# Patient Record
Sex: FEMALE | Race: WHITE | NOT HISPANIC OR LATINO | URBAN - METROPOLITAN AREA
[De-identification: names, ages, dates, MRNs, and addresses within clinical notes are randomized per-mention and may not be internally consistent; named-entity substitution may affect disease eponyms.]

---

## 2017-01-05 ENCOUNTER — COMMUNICATION - HEALTHEAST (OUTPATIENT)
Dept: NEUROLOGY | Facility: CLINIC | Age: 77
End: 2017-01-05

## 2017-01-09 ENCOUNTER — COMMUNICATION - HEALTHEAST (OUTPATIENT)
Dept: NEUROLOGY | Facility: CLINIC | Age: 77
End: 2017-01-09

## 2017-01-11 ENCOUNTER — HOSPITAL ENCOUNTER (OUTPATIENT)
Dept: RADIOLOGY | Facility: CLINIC | Age: 77
Discharge: HOME | End: 2017-01-11
Attending: PSYCHIATRY & NEUROLOGY

## 2017-01-11 ENCOUNTER — HOSPITAL ENCOUNTER (OUTPATIENT)
Dept: NEUROLOGY | Facility: CLINIC | Age: 77
Setting detail: THERAPIES SERIES
Discharge: STILL ADMITTED OR ADMINISTRATIVE | End: 2017-01-11
Attending: PSYCHIATRY & NEUROLOGY

## 2017-01-11 DIAGNOSIS — R06.02 SOB (SHORTNESS OF BREATH): ICD-10-CM

## 2017-01-11 DIAGNOSIS — F32.A DEPRESSION: ICD-10-CM

## 2017-01-11 LAB
ATRIAL RATE - MUSE: 54 BPM
DIASTOLIC BLOOD PRESSURE - MUSE: NORMAL MMHG
INTERPRETATION ECG - MUSE: NORMAL
P AXIS - MUSE: 16 DEGREES
PR INTERVAL - MUSE: 212 MS
QRS DURATION - MUSE: 92 MS
QT - MUSE: 514 MS
QTC - MUSE: 487 MS
R AXIS - MUSE: -40 DEGREES
SYSTOLIC BLOOD PRESSURE - MUSE: NORMAL MMHG
T AXIS - MUSE: 35 DEGREES
VENTRICULAR RATE- MUSE: 54 BPM

## 2017-01-12 ENCOUNTER — AMBULATORY - HEALTHEAST (OUTPATIENT)
Dept: NEUROLOGY | Facility: CLINIC | Age: 77
End: 2017-01-12

## 2017-01-12 DIAGNOSIS — F32.A DEPRESSION: ICD-10-CM

## 2017-01-19 ENCOUNTER — COMMUNICATION - HEALTHEAST (OUTPATIENT)
Dept: NEUROLOGY | Facility: CLINIC | Age: 77
End: 2017-01-19

## 2017-01-23 ENCOUNTER — COMMUNICATION - HEALTHEAST (OUTPATIENT)
Dept: NEUROLOGY | Facility: CLINIC | Age: 77
End: 2017-01-23

## 2017-02-02 ENCOUNTER — COMMUNICATION - HEALTHEAST (OUTPATIENT)
Dept: NEUROLOGY | Facility: CLINIC | Age: 77
End: 2017-02-02

## 2017-02-06 ENCOUNTER — HOSPITAL ENCOUNTER (EMERGENCY)
Facility: CLINIC | Age: 77
Discharge: HOME | End: 2017-02-06
Attending: FAMILY MEDICINE | Admitting: FAMILY MEDICINE
Payer: MEDICARE

## 2017-02-06 VITALS
BODY MASS INDEX: 23.77 KG/M2 | SYSTOLIC BLOOD PRESSURE: 100 MMHG | DIASTOLIC BLOOD PRESSURE: 93 MMHG | OXYGEN SATURATION: 100 % | WEIGHT: 130 LBS | HEART RATE: 60 BPM | TEMPERATURE: 98.1 F | RESPIRATION RATE: 16 BRPM

## 2017-02-06 DIAGNOSIS — R41.0 DELIRIUM: ICD-10-CM

## 2017-02-06 DIAGNOSIS — F03.91 DEMENTIA WITH BEHAVIORAL DISTURBANCE, UNSPECIFIED DEMENTIA TYPE: ICD-10-CM

## 2017-02-06 LAB
ALBUMIN SERPL-MCNC: 3.5 G/DL (ref 3.4–5)
ALBUMIN UR-MCNC: 100 MG/DL
ALP SERPL-CCNC: 96 U/L (ref 40–150)
ALT SERPL W P-5'-P-CCNC: 19 U/L (ref 0–50)
AMMONIA PLAS-SCNC: 22 UMOL/L (ref 10–50)
AMPHETAMINES UR QL SCN: NORMAL
ANION GAP SERPL CALCULATED.3IONS-SCNC: 12 MMOL/L (ref 3–14)
APPEARANCE UR: CLEAR
AST SERPL W P-5'-P-CCNC: 18 U/L (ref 0–45)
BARBITURATES UR QL: NORMAL
BASOPHILS # BLD AUTO: 0 10E9/L (ref 0–0.2)
BASOPHILS NFR BLD AUTO: 0.3 %
BENZODIAZ UR QL: NORMAL
BILIRUB SERPL-MCNC: 0.2 MG/DL (ref 0.2–1.3)
BILIRUB UR QL STRIP: NEGATIVE
BUN SERPL-MCNC: 28 MG/DL (ref 7–30)
CALCIUM SERPL-MCNC: 8.5 MG/DL (ref 8.5–10.1)
CANNABINOIDS UR QL SCN: NORMAL
CHLORIDE SERPL-SCNC: 108 MMOL/L (ref 94–109)
CO2 BLDCOV-SCNC: 20 MMOL/L (ref 21–28)
CO2 SERPL-SCNC: 21 MMOL/L (ref 20–32)
COCAINE UR QL: NORMAL
COLOR UR AUTO: YELLOW
CREAT SERPL-MCNC: 2.49 MG/DL (ref 0.52–1.04)
DIFFERENTIAL METHOD BLD: ABNORMAL
EOSINOPHIL # BLD AUTO: 0.1 10E9/L (ref 0–0.7)
EOSINOPHIL NFR BLD AUTO: 1.4 %
ERYTHROCYTE [DISTWIDTH] IN BLOOD BY AUTOMATED COUNT: 14.1 % (ref 10–15)
ETHANOL UR QL SCN: NORMAL
GFR SERPL CREATININE-BSD FRML MDRD: 19 ML/MIN/1.7M2
GLUCOSE SERPL-MCNC: 72 MG/DL (ref 70–99)
GLUCOSE UR STRIP-MCNC: NEGATIVE MG/DL
HCT VFR BLD AUTO: 28.6 % (ref 35–47)
HGB BLD-MCNC: 9.6 G/DL (ref 11.7–15.7)
HGB UR QL STRIP: NEGATIVE
IMM GRANULOCYTES # BLD: 0 10E9/L (ref 0–0.4)
IMM GRANULOCYTES NFR BLD: 0.4 %
KETONES UR STRIP-MCNC: NEGATIVE MG/DL
LACTATE BLD-SCNC: 0.5 MMOL/L (ref 0.7–2.1)
LEUKOCYTE ESTERASE UR QL STRIP: NEGATIVE
LYMPHOCYTES # BLD AUTO: 1.2 10E9/L (ref 0.8–5.3)
LYMPHOCYTES NFR BLD AUTO: 17.3 %
MAGNESIUM SERPL-MCNC: 2.5 MG/DL (ref 1.6–2.3)
MCH RBC QN AUTO: 31.6 PG (ref 26.5–33)
MCHC RBC AUTO-ENTMCNC: 33.6 G/DL (ref 31.5–36.5)
MCV RBC AUTO: 94 FL (ref 78–100)
MONOCYTES # BLD AUTO: 0.8 10E9/L (ref 0–1.3)
MONOCYTES NFR BLD AUTO: 11.6 %
NEUTROPHILS # BLD AUTO: 4.8 10E9/L (ref 1.6–8.3)
NEUTROPHILS NFR BLD AUTO: 69 %
NITRATE UR QL: NEGATIVE
NRBC # BLD AUTO: 0 10*3/UL
NRBC BLD AUTO-RTO: 0 /100
OPIATES UR QL SCN: NORMAL
PCO2 BLDV: 34 MM HG (ref 40–50)
PH BLDV: 7.38 PH (ref 7.32–7.43)
PH UR STRIP: 6 PH (ref 5–7)
PLATELET # BLD AUTO: 249 10E9/L (ref 150–450)
PO2 BLDV: 25 MM HG (ref 25–47)
POTASSIUM SERPL-SCNC: 4.3 MMOL/L (ref 3.4–5.3)
PROT SERPL-MCNC: 6.2 G/DL (ref 6.8–8.8)
RBC # BLD AUTO: 3.04 10E12/L (ref 3.8–5.2)
RBC #/AREA URNS AUTO: <1 /HPF (ref 0–2)
SAO2 % BLDV FROM PO2: 44 %
SODIUM SERPL-SCNC: 141 MMOL/L (ref 133–144)
SP GR UR STRIP: 1.01 (ref 1–1.03)
TSH SERPL DL<=0.05 MIU/L-ACNC: 1.77 MU/L (ref 0.4–4)
URN SPEC COLLECT METH UR: ABNORMAL
UROBILINOGEN UR STRIP-MCNC: NORMAL MG/DL (ref 0–2)
WBC # BLD AUTO: 7 10E9/L (ref 4–11)
WBC #/AREA URNS AUTO: 2 /HPF (ref 0–2)

## 2017-02-06 PROCEDURE — 36415 COLL VENOUS BLD VENIPUNCTURE: CPT | Performed by: FAMILY MEDICINE

## 2017-02-06 PROCEDURE — 80053 COMPREHEN METABOLIC PANEL: CPT | Performed by: FAMILY MEDICINE

## 2017-02-06 PROCEDURE — 82140 ASSAY OF AMMONIA: CPT | Performed by: FAMILY MEDICINE

## 2017-02-06 PROCEDURE — 83735 ASSAY OF MAGNESIUM: CPT | Performed by: FAMILY MEDICINE

## 2017-02-06 PROCEDURE — 99284 EMERGENCY DEPT VISIT MOD MDM: CPT | Performed by: FAMILY MEDICINE

## 2017-02-06 PROCEDURE — 82803 BLOOD GASES ANY COMBINATION: CPT

## 2017-02-06 PROCEDURE — 85025 COMPLETE CBC W/AUTO DIFF WBC: CPT | Performed by: FAMILY MEDICINE

## 2017-02-06 PROCEDURE — 84443 ASSAY THYROID STIM HORMONE: CPT | Performed by: FAMILY MEDICINE

## 2017-02-06 PROCEDURE — 99285 EMERGENCY DEPT VISIT HI MDM: CPT | Mod: 25 | Performed by: FAMILY MEDICINE

## 2017-02-06 PROCEDURE — 80320 DRUG SCREEN QUANTALCOHOLS: CPT | Performed by: FAMILY MEDICINE

## 2017-02-06 PROCEDURE — 93010 ELECTROCARDIOGRAM REPORT: CPT | Mod: Z6 | Performed by: FAMILY MEDICINE

## 2017-02-06 PROCEDURE — 81001 URINALYSIS AUTO W/SCOPE: CPT | Performed by: FAMILY MEDICINE

## 2017-02-06 PROCEDURE — 96361 HYDRATE IV INFUSION ADD-ON: CPT | Performed by: FAMILY MEDICINE

## 2017-02-06 PROCEDURE — 25000128 H RX IP 250 OP 636: Performed by: FAMILY MEDICINE

## 2017-02-06 PROCEDURE — 96360 HYDRATION IV INFUSION INIT: CPT | Performed by: FAMILY MEDICINE

## 2017-02-06 PROCEDURE — 83605 ASSAY OF LACTIC ACID: CPT

## 2017-02-06 PROCEDURE — 93005 ELECTROCARDIOGRAM TRACING: CPT | Performed by: FAMILY MEDICINE

## 2017-02-06 PROCEDURE — 80307 DRUG TEST PRSMV CHEM ANLYZR: CPT | Performed by: FAMILY MEDICINE

## 2017-02-06 RX ORDER — LIDOCAINE 40 MG/G
CREAM TOPICAL
Status: DISCONTINUED | OUTPATIENT
Start: 2017-02-06 | End: 2017-02-06 | Stop reason: HOSPADM

## 2017-02-06 RX ORDER — SODIUM CHLORIDE 9 MG/ML
1000 INJECTION, SOLUTION INTRAVENOUS CONTINUOUS
Status: DISCONTINUED | OUTPATIENT
Start: 2017-02-06 | End: 2017-02-06 | Stop reason: HOSPADM

## 2017-02-06 RX ADMIN — SODIUM CHLORIDE 1000 ML: 9 INJECTION, SOLUTION INTRAVENOUS at 17:29

## 2017-02-06 RX ADMIN — SODIUM CHLORIDE 1000 ML: 9 INJECTION, SOLUTION INTRAVENOUS at 19:16

## 2017-02-06 ASSESSMENT — ENCOUNTER SYMPTOMS
FATIGUE: 1
ABDOMINAL PAIN: 0
FEVER: 0
APPETITE CHANGE: 1
SHORTNESS OF BREATH: 0

## 2017-02-06 NOTE — ED AVS SNAPSHOT
[facility], [place], Emergency Department    [address]    [facility] [address]    Phone:  [phone]    Fax:  [phone]                                       Sandy Montana   MRN: [id number]    Department:  [facility], Emergency Department   Date of Visit:  2/6/2017           After Visit Summary Signature Page     I have received my discharge instructions, and my questions have been answered. I have discussed any challenges I see with this plan with the nurse or doctor.    ..........................................................................................................................................  Patient/Patient Representative Signature      ..........................................................................................................................................  Patient Representative Print Name and Relationship to Patient    ..................................................               ................................................  Date                                            Time    ..........................................................................................................................................  Reviewed by Signature/Title    ...................................................              ..............................................  Date                                                            Time

## 2017-02-06 NOTE — ED PROVIDER NOTES
History     Chief Complaint   Patient presents with     Generalized Weakness     HPI  Sandy Montana is a 76 year old female with a history of HTN, lymphoma (2011, 2015 currently on Rituximab), dementia, sleep apnea, CVA 2 years ago and depression. Hospitalized at [facility] (1/26-1/27/2017) for a UTI and confusion. The patient was diagnosed with a UTI on 1/24 (~2 weeks ago) and was started on a 7 day course of Keflex. However, her UC was resistant to Keflex and so was switched to Ciprofloxacin on 1/26. She then began to exhibit confusion and agitation and was admitted. She was treated with IV ceftriaxone with improvement in her mentation. She was then admitted again to [facility] (1/31-2/2/2017) due to weakness. CT and MRI negative. She presents to the Emergency Department today for evaluation of fatigue. Per the patient's care giver, for the past 6 weeks the patient has been difficult to arouse in the morning to get up for the day and when she does get up she will sit in her chair and fall asleep. They also have to force to her eat. This has persisted even with treatment of her UTI a few weeks ago. Today, the patient fell asleep in her chair around 11:00 AM (~7 hours ago) and her care giver could not wake her for 3 hours, prompting her to call EMS. No known cough or fever. The patient denies any pain. No other concerns or complaints.  ppatient is been taking her Effexor for 2 years typically in the morning.  She noted per her daughter that when she is in the hospital her behavior improves only when she sent home daughter is concerned that  Patient lives in an independent assisted living does have PCA care during the day.  But not atNight time.      Past Medical History   Diagnosis Date     Depressive disorder      Hypertension      CVA (cerebral infarction) 1/2014     Dementia      Chronic kidney disease      Lymphoma (H)        No past surgical history on file.    No family history on  file.    Social History   Substance Use Topics     Smoking status: Not on file     Smokeless tobacco: Not on file     Alcohol Use: Not on file       No current facility-administered medications for this encounter.     Current Outpatient Prescriptions   Medication     VENLAFAXINE HCL PO     AMLODIPINE BESYLATE PO     Omega-3 Fatty Acids (OMEGA-3 FISH OIL PO)     DONEPEZIL HCL PO     MEMANTINE HCL PO     ASPIRIN PO     ATORVASTATIN CALCIUM PO     Calcium Carbonate-Vitamin D (CALCIUM + D PO)     VITAMIN D, CHOLECALCIFEROL, PO     OMEPRAZOLE PO     Solifenacin Succinate (VESICARE PO)     LOSARTAN POTASSIUM PO      No Known Allergies    I have reviewed the Medications, Allergies, Past Medical and Surgical History, and Social History in the Epic system.    Review of Systems   Constitutional: Positive for activity change (patient now is been awake today although was very sleepylethargic earlier today), appetite change (decreased) and fatigue. Negative for fever.   HENT: Negative for congestion, facial swelling, sinus pressure and trouble swallowing.    Eyes: Negative for redness and visual disturbance.   Respiratory: Negative for cough, choking and shortness of breath.    Cardiovascular: Negative for chest pain and leg swelling.   Gastrointestinal: Negative for nausea, vomiting and abdominal pain.   Genitourinary: Negative for flank pain and difficulty urinating.        Patient baseline incontinent of urine   Musculoskeletal: Negative for back pain, arthralgias, gait problem and neck stiffness.   Skin: Negative for color change, rash and wound.   Allergic/Immunologic: Negative for immunocompromised state.   Neurological: Positive for weakness (Gen. patient previous strokes slight right-sided weakness unchanged). Negative for seizures, numbness and headaches.   Hematological: Does not bruise/bleed easily.   Psychiatric/Behavioral: Positive for confusion, sleep disturbance, dysphoric mood and decreased concentration.  Negative for hallucinations. The patient is nervous/anxious. The patient is not hyperactive.    All other systems reviewed and are negative.      Physical Exam   BP: 153/72 mmHg  Pulse: 51  Temp: 97.3  F (36.3  C)  Resp: 12  SpO2: 98 %  Physical Exam   Constitutional: She is oriented to person, place, and time. She appears well-developed and well-nourished. She appears distressed.   Patient here in the ER is awake staring continues to repeat and anxious placing I just can't sleep  Denies any other complaints of pain   HENT:   Head: Normocephalic and atraumatic.   Eyes: Conjunctivae and EOM are normal. Pupils are equal, round, and reactive to light. No scleral icterus.   Neck: Normal range of motion. Neck supple. No JVD present.   Cardiovascular: Regular rhythm.    Pulmonary/Chest: No stridor. No respiratory distress. She has no wheezes.   Abdominal: She exhibits no distension. There is no tenderness. There is no rebound.   Musculoskeletal: She exhibits no edema or tenderness.   Neurological: She is alert and oriented to person, place, and time. She has normal reflexes.   Skin: Skin is warm and dry. No rash noted. She is not diaphoretic. No erythema. No pallor.   Psychiatric:   Patient with a flattened affect somewhat staring at times findings concerning for maybe some mild delirium.   Patient has the dementia also.   Nursing note and vitals reviewed.      ED Course     4:51 PM  The patient was seen and examined by Dr. Escoto in Room 6.     Procedures           Patient ER was evaluated.  eKG interpreted below.  Records reviewed in caring aware with patient's recent hospitalizations at [facility]  Patient received a liter normal saline IV bolus in ER.  Laboratory evaluations urinalysis without infection white count was 7 hemoglobin is 9.6 magnesium 2.5  Creatinine 2.49.  Sodium 140 potassium 4.3.  TSH 1.77.  Ammonia 22.  PCO2 is 34 and a venous blood gas lactic acid 0.5.    Evaluating patient patient is awake here in the ER  is not lethargic is not somnolent seems to be somewhat staringanxious underlying with findings consistent with some delirium component.  Discussed at length with psychiatry Dr. Rhodes who felt at this point reviewing patient's medications  Recreations are to take the Effexor at bedtime which may add to increasing somnolence at night and then not be so sleepy during the day.  Discussed at length with the daughter who is here is voicing concerns I did talk to the hospitalist also felt from a medical standpoint there is really no other workup that would do in the hospital.  Daughter at this point though is comfortable with the plan of taking her home and she'll stay with her tonight she'll take a half a dose of her Effexor tonight as she took her normal dose earlier today and then will start with an evening dose tomorrow and see how that does.  Also make contact with psychiatry associate with [facility] who they are afiliated with.  Patient then discharged with daughter back home.        EKG Interpretation:      Interpreted by Anshu Escoto  Time reviewed: 1713  Symptoms at time of EKG: sleepy   Rhythm: normal sinus   Rate: normal  Axis: normal  Ectopy: none  Conduction: first-degree  ST Segments/ T Waves: No hhyperacuteST-T wave changes  Q Waves: none  Comparison to prior: No old EKG available    Clinical Impression: normal sinus rhythm first-degree block          Critical Care time:  none               Labs Ordered and Resulted from Time of ED Arrival Up to the Time of Departure from the ED   CBC WITH PLATELETS DIFFERENTIAL - Abnormal; Notable for the following:     RBC Count 3.04 (*)     Hemoglobin 9.6 (*)     Hematocrit 28.6 (*)     All other components within normal limits   MAGNESIUM - Abnormal; Notable for the following:     Magnesium 2.5 (*)     All other components within normal limits   COMPREHENSIVE METABOLIC PANEL - Abnormal; Notable for the following:     Creatinine 2.49 (*)     GFR Estimate 19 (*)      GFR Estimate If Black 23 (*)     Protein Total 6.2 (*)     All other components within normal limits   UA MACROSCOPIC WITH REFLEX TO MICRO AND CULTURE - Abnormal; Notable for the following:     Protein Albumin Urine 100 (*)     All other components within normal limits   ISTAT  GASES LACTATE AMAURY POCT - Abnormal; Notable for the following:     PCO2 Venous 34 (*)     Bicarbonate Venous 20 (*)     Lactic Acid 0.5 (*)     All other components within normal limits   TSH   AMMONIA   DRUG ABUSE SCREEN 6 CHEM DEP URINE ([facility])   PULSE OXIMETRY NURSING   CARDIAC CONTINUOUS MONITORING   PERIPHERAL IV CATHETER   ISTAT CG4 GASES LACTATE AMAURY NURSING POCT     Results for orders placed or performed during the hospital encounter of 02/06/17   CBC with platelets differential   Result Value Ref Range    WBC 7.0 4.0 - 11.0 10e9/L    RBC Count 3.04 (L) 3.8 - 5.2 10e12/L    Hemoglobin 9.6 (L) 11.7 - 15.7 g/dL    Hematocrit 28.6 (L) 35.0 - 47.0 %    MCV 94 78 - 100 fl    MCH 31.6 26.5 - 33.0 pg    MCHC 33.6 31.5 - 36.5 g/dL    RDW 14.1 10.0 - 15.0 %    Platelet Count 249 150 - 450 10e9/L    Diff Method Automated Method     % Neutrophils 69.0 %    % Lymphocytes 17.3 %    % Monocytes 11.6 %    % Eosinophils 1.4 %    % Basophils 0.3 %    % Immature Granulocytes 0.4 %    Nucleated RBCs 0 0 /100    Absolute Neutrophil 4.8 1.6 - 8.3 10e9/L    Absolute Lymphocytes 1.2 0.8 - 5.3 10e9/L    Absolute Monocytes 0.8 0.0 - 1.3 10e9/L    Absolute Eosinophils 0.1 0.0 - 0.7 10e9/L    Absolute Basophils 0.0 0.0 - 0.2 10e9/L    Abs Immature Granulocytes 0.0 0 - 0.4 10e9/L    Absolute Nucleated RBC 0.0    Magnesium   Result Value Ref Range    Magnesium 2.5 (H) 1.6 - 2.3 mg/dL   Comprehensive metabolic panel   Result Value Ref Range    Sodium 141 133 - 144 mmol/L    Potassium 4.3 3.4 - 5.3 mmol/L    Chloride 108 94 - 109 mmol/L    Carbon Dioxide 21 20 - 32 mmol/L    Anion Gap 12 3 - 14 mmol/L    Glucose 72 70 - 99 mg/dL    Urea Nitrogen 28 7 - 30 mg/dL     Creatinine 2.49 (H) 0.52 - 1.04 mg/dL    GFR Estimate 19 (L) >60 mL/min/1.7m2    GFR Estimate If Black 23 (L) >60 mL/min/1.7m2    Calcium 8.5 8.5 - 10.1 mg/dL    Bilirubin Total 0.2 0.2 - 1.3 mg/dL    Albumin 3.5 3.4 - 5.0 g/dL    Protein Total 6.2 (L) 6.8 - 8.8 g/dL    Alkaline Phosphatase 96 40 - 150 U/L    ALT 19 0 - 50 U/L    AST 18 0 - 45 U/L   TSH   Result Value Ref Range    TSH 1.77 0.40 - 4.00 mU/L   Ammonia   Result Value Ref Range    Ammonia 22 10 - 50 umol/L   UA reflex to Microscopic and Culture   Result Value Ref Range    Color Urine Yellow     Appearance Urine Clear     Glucose Urine Negative NEG mg/dL    Bilirubin Urine Negative NEG    Ketones Urine Negative NEG mg/dL    Specific Gravity Urine 1.007 1.003 - 1.035    Blood Urine Negative NEG    pH Urine 6.0 5.0 - 7.0 pH    Protein Albumin Urine 100 (A) NEG mg/dL    Urobilinogen mg/dL Normal 0.0 - 2.0 mg/dL    Nitrite Urine Negative NEG    Leukocyte Esterase Urine Negative NEG    Source Catheterized Urine     RBC Urine <1 0 - 2 /HPF    WBC Urine 2 0 - 2 /HPF   Drug abuse screen 6 urine (chem dep)   Result Value Ref Range    Amphetamine Qual Urine  NEG     Negative   Cutoff for a negative amphetamine is 500 ng/mL or less.      Barbiturates Qual Urine  NEG     Negative   Cutoff for a negative barbiturate is 200 ng/mL or less.      Benzodiazepine Qual Urine  NEG     Negative   Cutoff for a negative benzodiazepine is 200 ng/mL or less.      Cannabinoids Qual Urine  NEG     Negative   Cutoff for a negative cannabinoid is 50 ng/mL or less.      Cocaine Qual Urine  NEG     Negative   Cutoff for a negative cocaine is 300 ng/mL or less.      Ethanol Qual Urine  NEG     Negative   Cutoff for a negative urine ethanol is 0.05 g/dL or less      Opiates Qualitative Urine  NEG     Negative   Cutoff for a negative opiate is 300 ng/mL or less.     EKG 12-lead, tracing only   Result Value Ref Range    Interpretation ECG Click View Image link to view waveform and result     ISTAT gases lactate maximiliano POCT   Result Value Ref Range    Ph Venous 7.38 7.32 - 7.43 pH    PCO2 Venous 34 (L) 40 - 50 mm Hg    PO2 Venous 25 25 - 47 mm Hg    Bicarbonate Venous 20 (L) 21 - 28 mmol/L    O2 Sat Venous 44 %    Lactic Acid 0.5 (L) 0.7 - 2.1 mmol/L       Assessments & Plan (with Medical Decision Making)  6-year-old female history of dementia who has had 4+ weeks of increasing somnolence during the day yesterday complaining about sleeping difficulties.  He is supposed to see pap but has not been using it for quite a while there is no signs of CO2 retention although her symptoms still may be partly related to that issue.  Patient has been on Effexor during the day of her behavior improves and hospital she becomes more somnolent at home feeling at this point is to switch the Effexor 200 bedtime doseto see if this improves sleep pattern at night encourage her to use  The C pap and to follow-up with psychiatry at [place].  Patient at this point labs reviewed given IV fluids no sign of UTIand his CO2 retention cardiac evaluation Center stablepatient may be slightly dehydratedbut did receive the IV fluids.   She most likely is having some delirium which may persist for several weeks so seen with her dementia and may be progression of the disease discussed with daughter at length that they may need to look into further 24-hour care with her currently at this point but will try medication change encouraging that C Pap unit to see if this does improve also.         I have reviewed the nursing notes.    I have reviewed the findings, diagnosis, plan and need for follow up with the patient.    Discharge Medication List as of 2/6/2017  9:33 PM          Final diagnoses:   Delirium   Dementia with behavioral disturbance, unspecified dementia type     I, Nae Rider, am serving as a trained medical scribe to document services personally performed by Anshu Escoto MD, based on the provider's statements to me.      I,  Anshu Escoto MD, was physically present and have reviewed and verified the accuracy of this note documented by Nae Rider.     2/6/2017   [facility], [facility] EMERGENCY DEPARTMENT      This note was created at least in part by the use of dragon voice dictation system. Inadvertent typographical errors may still exist.  Anshu Escoto MD.        Anshu Escoto MD  02/07/17 2002

## 2017-02-06 NOTE — ED NOTES
Sent from care facility where staff reported her unresponsive. Staff can't wake pt up. Staff here with patient. States pt was sleeping. Pt requests a sleeping pill. Staff reports pt is exhausted and would sleep all the time if able. Staff feel there is more of a mental health issue with patient rather a medical problem.

## 2017-02-06 NOTE — ED NOTES
Bed: ED06  Expected date: 2/6/17  Expected time: 2:27 PM  Means of arrival: Ambulance  Comments:  [address] 73 y/o F, lethargic.

## 2017-02-06 NOTE — ED AVS SNAPSHOT
[facility], Emergency Department    [address]    [facility] [address]    Phone:  [phone]    Fax:  [phone]                                       Sandy [place]   MRN: [id number]    Department:  [facility] [place], Emergency Department   Date of Visit:  2/6/2017           Patient Information     Date Of Birth          1940        Your diagnoses for this visit were:     Delirium     Dementia with behavioral disturbance, unspecified dementia type        You were seen by Anshu Escoto MD.      Follow-up Information     Follow up with Zaida Smith.    Specialty:  Internal Medicine    Contact information:    [facility]  [address] [facility] [address] [phone]          Discharge Instructions       Home.  Current recommendations are to take effexor 37.5mg tonight before bed, then tomorrow switch to effexor 75mg at bedtime to see if this helps sleep and has better daytime awakeness.  Follow up with [place] [facility] for further recommendations.  Try to use Cpap also to see if this helps behavior and sleep.    For Caregivers: Daily Care for Dementia Patients  Over time, people with dementia will need more and more help with daily tasks. These include eating meals, taking medicines, and getting enough exercise. They also include personal care needs, such as bathing and dressing. To reduce stress, make these activities part of a routine. Ask family and friends to lend a hand. And be aware that your loved one s abilities can change from day to day. If you have problems meeting your loved one s needs, it s time to get help. Talk to a  or local support agency--such as a local [employer] s [employer] chapter.      Gardening can be a pleasant way to keep your loved one active.   Activity and exercise  Regular activity is good for your loved one s body and mind. It may even help slow the progression of the disease. Keep to your loved one s old routines when  possible. It also helps to:    Do things together. Go for a walk, garden, or bake a cake. Basic, repetitive activities are good choices.    Be active as often as possible. This releases pent-up energy, which can reduce restlessness and improve sleep.    Include social activities. Take your loved one to see friends and family. But try to keep things simple. Loud noises, crowds, or too many people talking at once can be upsetting.  Taking medicines  Be sure all prescribed medicines are taken as directed. These tips can help:    Provide supervision if your loved one cannot safely take medicines alone.    Set a routine so medicines are taken at the same time each day.    Ensure that ALL medicines are taken. A pillbox can help you keep track.    Plan ahead. Be sure to refill prescriptions before they run out.  Eating meals  At mealtime, serve healthy foods with plenty of fluids. These tips can also help:    Keep meals simple. Too many choices can be overwhelming. Try to maintain a calm, quiet atmosphere while you eat.    Place healthy snacks, such as fresh fruit, out where they can be seen.    Watch eating habits. People with dementia may eat too little or too much. Talk to the healthcare provider if you have concerns.    Try finger foods if regular meals become too difficult for your loved one to eat.  Dressing  People with dementia may have trouble choosing what to wear. It s OK if clothes don t always match. But if help is needed:    Choose clothing that is easy to put on and take off. Use Velcro shoes or slippers.    Lay out a fresh outfit each day. Place clothes in the order they should be put on.    If more help is needed, hand over clothing items one at a time. Explain how each item should be put on.    Put dirty clothes away so they re not worn again.  Bathing and grooming  Getting your loved one to bathe can be a real challenge. Try these tips:    Treat bathing as a routine activity. But be flexible. A daily  bath is probably unrealistic.    Prepare bath items ahead of time, and be sure to test the water temperature.    Avoid leaving your loved one alone in the bath or shower.               Try visiting a barbershop or [employer] salon for help with hair washing, hair styling, and shaving.  Using the toilet  In later stages, dementia patients may develop incontinence (trouble controlling the bladder or bowel). To ease problems:    Set a routine for using the toilet (for example, every 3 hours) and stick to it.    Limit beverages before bedtime to prevent accidents. A bedside commode may also help.    Be understanding if accidents happen. Your loved one may be as upset as you.    At one point it may be necessary to have them wear an adult diaper.      Talk to a healthcare provider if incontinence develops suddenly. It may signal other health issues that can be treated.  When to call the healthcare provider  Call the healthcare provider if you notice a sudden change in your loved one s behavior or emotions. These changes may be due to dementia. But they could also signal other health problems that can be treated.   NOTE:This sheet is a summary. It may not cover all possible information. If you have questions about this medicine, talk to your doctor, pharmacist, or health care provider. Copyright  2016 [employer]          Caring for a Person with Delirium  Delirium is very common in patients with advanced illness. With delirium, patients have periods in which they are suddenly confused and unaware of what is going on around them. They may become agitated and restless or withdrawn. Delirium can be very upsetting for family members to witness. If your loved one develops signs of delirium, let his or her health care provider know right away. In some cases, the cause of the delirium can be treated. In others, steps can be taken to help manage delirium and ensure your loved one s safety and comfort.     With delirium, there may  be times when your loved one is restless and agitated or quiet and withdrawn.   What are common causes of delirium?  Delirium often has multiple causes. These can include:    Infections    Medicines    Serious or terminal illness    Hospitalization    Changes in environment    Drug and alcohol abuse    Electrolyte imbalance    Other problems, like anemia or nutrition deficiency  What are the types and symptoms of delirium?  There are 2 main types of delirium: hypoactive and hyperactive. Patients can have 1 or both types. In a hypoactive state, patients are sleepy and withdrawn. They may show little interest in their surroundings. In a hyperactive state, patients are excitable and agitated. They may become violent. And they may believe in or see things that aren t there. Most patients with delirium will also have the following symptoms:    Changes in sleep patterns    Confusion about time and place (disorientation)    Wandering attention    Disorganized thinking    Problems with memory and speech    Changes in mood or personality    Hallucinations  How is delirium treated?  Your loved one s health care provider and health care team will try to identify the cause of the delirium and treat it, if possible. Sometimes, the cause cannot be found. Or treatment may be available, but it may be too much of a burden at this point in their illness. In such cases, the main goal of treatment is to manage the delirium and keep your loved one safe and comfortable. You may be told to do the following:    Provide safe and familiar surroundings. Keep your loved one s room clean and well-lit. Have familiar objects nearby, like a favorite blanket and family photos. Add a clock next to the patient s bed and a calendar on the wall to help your loved one keep track of time.    Limit contact with strangers. Try to make sure that your loved one receives care from the same health care providers or caregivers. Keep visitors restricted to  family members or close friends to reduce confusion.    Maintain a regular day and night schedule. During the day, open blinds and windows or keep the lights on to encourage your loved one to stay awake and alert. During the night, dim the lights and keep noise levels low to encourage sleep.    Expect sudden changes in behavior. There may be times when your loved one is normal and alert. But other times he or she is not fully present. Your loved one may forget who you are. He or she may also imagine things or speak to people who aren t there. Try to stay calm during these episodes. It may help to provide a gentle touch or reassuring words. Or you may choose not to speak and simply listen.    Use positive language. Try not to raise your voice or argue with your loved one. Keep conversations simple. If your loved one is confused, state simply and calmly where he or she is and what is going on.    Minimize the use of restraints and encourage movement as soon as possible. These can make a person more anxious, afraid, or angry and increase confusion. If needed, arrange for a 24-hour caregiver or nurse, so your loved one is never left alone. Or take turns sitting next to the patient s bedside with other family members and friends.    Alert the health care provider if your loved one s delirium worsens. If needed, medicine can be prescribed to help your loved one sleep or stay calm.  Treatment of delirium as death nears  Treatment of delirium may change when your loved one is near death. The health care provider and health care team can help prepare you for what to expect in the last days of life. If delirium is severe, ask the health care provider about choices for keeping your loved one comfortable. Don t be afraid to ask questions or seek help at this time. If more support is needed, other health care team members, like a  or , can help.   Helpful tips for preventing delirium  Tips to help  prevent delirium include:    Use visual and hearing aids as needed    Manage pain    Keep watch on the use of certain medicines or avoid them altogether. Certain medicines can trigger delirium.    Prevent medical complications or treat them if they happen. Some medical conditions are known to cause or worsen delirium.  Delirium and the caregiver  Caregivers of patients with delirium play an important role that has 3 parts. They try to prevent delirium and spot it if it happens. They also act as patent advocates. And, they help to keep watch on patient symptoms. But caring for patients with delirium can take its toll on caregivers. They need support to cope with the stress and emotions of providing such care. If you are a caregiver to a patient with delirium, you may find a support group and/or educational materials helpful during this stressful and emotional time.       6504-3230 [employer]. [address] [place] [address]. All rights reserved. This information is not intended as a substitute for professional medical care. Always follow your healthcare professional's instructions.          24 Hour Appointment Hotline       To make an appointment at any [facility], call [phone] ([phone]). If you don't have a family doctor or clinic, we will help you find one. [facility] clinics are conveniently located to serve the needs of you and your family.             Review of your medicines      Our records show that you are taking the medicines listed below. If these are incorrect, please call your family doctor or clinic.        Dose / Directions Last dose taken    AMLODIPINE BESYLATE PO   Dose:  5 mg   Indication:  High Blood Pressure        Take 5 mg by mouth   Refills:  0        ASPIRIN PO   Dose:  81 mg        Take 81 mg by mouth daily   Refills:  0        ATORVASTATIN CALCIUM PO   Dose:  10 mg        Take 10 mg by mouth Takes one pill in the evening   Refills:  0        CALCIUM  + D PO   Dose:  500 mg        Take 500 mg by mouth 2 times daily   Refills:  0        DONEPEZIL HCL PO   Dose:  10 mg        Take 10 mg by mouth At Bedtime   Refills:  0        LOSARTAN POTASSIUM PO   Dose:  12.5 mg        Take 12.5 mg by mouth daily   Refills:  0        MEMANTINE HCL PO   Dose:  5 mg        Take 5 mg by mouth   Refills:  0        OMEGA-3 FISH OIL PO        Take by mouth daily Dose unknown   Refills:  0        OMEPRAZOLE PO   Dose:  20 mg        Take 20 mg by mouth every morning   Refills:  0        VENLAFAXINE HCL PO   Dose:  37.5 mg        Take 37.5 mg by mouth 2 times daily   Refills:  0        VESICARE PO   Dose:  5 mg        Take 5 mg by mouth Once in the evening   Refills:  0        VITAMIN D (CHOLECALCIFEROL) PO   Dose:  2000 Units        Take 2,000 Units by mouth daily   Refills:  0                Procedures and tests performed during your visit     Ammonia    CBC with platelets differential    Cardiac Continuous Monitoring    Comprehensive metabolic panel    Drug abuse screen 6 urine (chem dep)    EKG 12-lead, tracing only    ISTAT CG4 gases lactate maximiliano nursing POCT    ISTAT gases lactate maximiliano POCT    Magnesium    Peripheral IV catheter    Pulse oximetry nursing    TSH    UA reflex to Microscopic and Culture      Orders Needing Specimen Collection     None      Pending Results     Date and Time Order Name Status Description    2/6/2017 1700 EKG 12-lead, tracing only Preliminary             Pending Culture Results     No orders found from 2/5/2017 to 2/7/2017.            Thank you for choosing [facility]       Thank you for choosing [facility] for your care. Our goal is always to provide you with excellent care. Hearing back from our patients is one way we can continue to improve our services. Please take a few minutes to complete the written survey that you may receive in the mail after you visit with us. Thank you!        [employer] Information     [employer] lets you send messages to your doctor,  "view your test results, renew your prescriptions, schedule appointments and more. To sign up, go to [email or web address] . Click on \"Log in\" on the left side of the screen, which will take you to the Welcome page. Then click on \"Sign up Now\" on the right side of the page.     You will be asked to enter the access code listed below, as well as some personal information. Please follow the directions to create your username and password.     Your access code is: [email or web address]  Expires: 2017  9:32 PM     Your access code will  in 90 days. If you need help or a new code, please call your [facility] clinic or [phone].        Care EveryWhere ID     This is your Care EveryWhere ID. This could be used by other organizations to access your [facility] medical records  [id number]        After Visit Summary       This is your record. Keep this with you and show to your community pharmacist(s) and doctor(s) at your next visit.                  "

## 2017-02-07 LAB — INTERPRETATION ECG - MUSE: NORMAL

## 2017-02-07 ASSESSMENT — ENCOUNTER SYMPTOMS
NERVOUS/ANXIOUS: 1
TROUBLE SWALLOWING: 0
NUMBNESS: 0
NAUSEA: 0
FACIAL SWELLING: 0
EYE REDNESS: 0
CHOKING: 0
COUGH: 0
ARTHRALGIAS: 0
SINUS PRESSURE: 0
HYPERACTIVE: 0
DECREASED CONCENTRATION: 1
NECK STIFFNESS: 0
WEAKNESS: 1
CONFUSION: 1
DYSPHORIC MOOD: 1
FLANK PAIN: 0
BRUISES/BLEEDS EASILY: 0
WOUND: 0
BACK PAIN: 0
DIFFICULTY URINATING: 0
HALLUCINATIONS: 0
SLEEP DISTURBANCE: 1
ACTIVITY CHANGE: 1
HEADACHES: 0
COLOR CHANGE: 0
VOMITING: 0
SEIZURES: 0

## 2017-02-07 NOTE — ED NOTES
Patient's bladder emptied by straight cath, output 120 mL.  Tolerated well.  Sterile urine specimen obtained at time of cath, and sent to lab.

## 2017-02-07 NOTE — DISCHARGE INSTRUCTIONS
Home.  Current recommendations are to take effexor 37.5mg tonight before bed, then tomorrow switch to effexor 75mg at bedtime to see if this helps sleep and has better daytime awakeness.  Follow up with [facility] for further recommendations.  Try to use Cpap also to see if this helps behavior and sleep.    For Caregivers: Daily Care for Dementia Patients  Over time, people with dementia will need more and more help with daily tasks. These include eating meals, taking medicines, and getting enough exercise. They also include personal care needs, such as bathing and dressing. To reduce stress, make these activities part of a routine. Ask family and friends to lend a hand. And be aware that your loved one s abilities can change from day to day. If you have problems meeting your loved one s needs, it s time to get help. Talk to a  or local support agency--such as a local [employer] chapter.      Gardening can be a pleasant way to keep your loved one active.   Activity and exercise  Regular activity is good for your loved one s body and mind. It may even help slow the progression of the disease. Keep to your loved one s old routines when possible. It also helps to:    Do things together. Go for a walk, garden, or bake a cake. Basic, repetitive activities are good choices.    Be active as often as possible. This releases pent-up energy, which can reduce restlessness and improve sleep.    Include social activities. Take your loved one to see friends and family. But try to keep things simple. Loud noises, crowds, or too many people talking at once can be upsetting.  Taking medicines  Be sure all prescribed medicines are taken as directed. These tips can help:    Provide supervision if your loved one cannot safely take medicines alone.    Set a routine so medicines are taken at the same time each day.    Ensure that ALL medicines are taken. A pillbox can help you keep track.    Plan ahead.  Be sure to refill prescriptions before they run out.  Eating meals  At mealtime, serve healthy foods with plenty of fluids. These tips can also help:    Keep meals simple. Too many choices can be overwhelming. Try to maintain a calm, quiet atmosphere while you eat.    Place healthy snacks, such as fresh fruit, out where they can be seen.    Watch eating habits. People with dementia may eat too little or too much. Talk to the healthcare provider if you have concerns.    Try finger foods if regular meals become too difficult for your loved one to eat.  Dressing  People with dementia may have trouble choosing what to wear. It s OK if clothes don t always match. But if help is needed:    Choose clothing that is easy to put on and take off. Use Velcro shoes or slippers.    Lay out a fresh outfit each day. Place clothes in the order they should be put on.    If more help is needed, hand over clothing items one at a time. Explain how each item should be put on.    Put dirty clothes away so they re not worn again.  Bathing and grooming  Getting your loved one to bathe can be a real challenge. Try these tips:    Treat bathing as a routine activity. But be flexible. A daily bath is probably unrealistic.    Prepare bath items ahead of time, and be sure to test the water temperature.    Avoid leaving your loved one alone in the bath or shower.               Try visiting a barbershop or [place] salon for help with hair washing, hair styling, and shaving.  Using the toilet  In later stages, dementia patients may develop incontinence (trouble controlling the bladder or bowel). To ease problems:    Set a routine for using the toilet (for example, every 3 hours) and stick to it.    Limit beverages before bedtime to prevent accidents. A bedside commode may also help.    Be understanding if accidents happen. Your loved one may be as upset as you.    At one point it may be necessary to have them wear an adult diaper.      Talk to  a healthcare provider if incontinence develops suddenly. It may signal other health issues that can be treated.  When to call the healthcare provider  Call the healthcare provider if you notice a sudden change in your loved one s behavior or emotions. These changes may be due to dementia. But they could also signal other health problems that can be treated.   NOTE:This sheet is a summary. It may not cover all possible information. If you have questions about this medicine, talk to your doctor, pharmacist, or health care provider. Copyright  2016 [employer]          Caring for a Person with Delirium  Delirium is very common in patients with advanced illness. With delirium, patients have periods in which they are suddenly confused and unaware of what is going on around them. They may become agitated and restless or withdrawn. Delirium can be very upsetting for family members to witness. If your loved one develops signs of delirium, let his or her health care provider know right away. In some cases, the cause of the delirium can be treated. In others, steps can be taken to help manage delirium and ensure your loved one s safety and comfort.     With delirium, there may be times when your loved one is restless and agitated or quiet and withdrawn.   What are common causes of delirium?  Delirium often has multiple causes. These can include:    Infections    Medicines    Serious or terminal illness    Hospitalization    Changes in environment    Drug and alcohol abuse    Electrolyte imbalance    Other problems, like anemia or nutrition deficiency  What are the types and symptoms of delirium?  There are 2 main types of delirium: hypoactive and hyperactive. Patients can have 1 or both types. In a hypoactive state, patients are sleepy and withdrawn. They may show little interest in their surroundings. In a hyperactive state, patients are excitable and agitated. They may become violent. And they may believe in or see things  that aren t there. Most patients with delirium will also have the following symptoms:    Changes in sleep patterns    Confusion about time and place (disorientation)    Wandering attention    Disorganized thinking    Problems with memory and speech    Changes in mood or personality    Hallucinations  How is delirium treated?  Your loved one s health care provider and health care team will try to identify the cause of the delirium and treat it, if possible. Sometimes, the cause cannot be found. Or treatment may be available, but it may be too much of a burden at this point in their illness. In such cases, the main goal of treatment is to manage the delirium and keep your loved one safe and comfortable. You may be told to do the following:    Provide safe and familiar surroundings. Keep your loved one s room clean and well-lit. Have familiar objects nearby, like a favorite blanket and family photos. Add a clock next to the patient s bed and a calendar on the wall to help your loved one keep track of time.    Limit contact with strangers. Try to make sure that your loved one receives care from the same health care providers or caregivers. Keep visitors restricted to family members or close friends to reduce confusion.    Maintain a regular day and night schedule. During the day, open blinds and windows or keep the lights on to encourage your loved one to stay awake and alert. During the night, dim the lights and keep noise levels low to encourage sleep.    Expect sudden changes in behavior. There may be times when your loved one is normal and alert. But other times he or she is not fully present. Your loved one may forget who you are. He or she may also imagine things or speak to people who aren t there. Try to stay calm during these episodes. It may help to provide a gentle touch or reassuring words. Or you may choose not to speak and simply listen.    Use positive language. Try not to raise your voice or argue with  your loved one. Keep conversations simple. If your loved one is confused, state simply and calmly where he or she is and what is going on.    Minimize the use of restraints and encourage movement as soon as possible. These can make a person more anxious, afraid, or angry and increase confusion. If needed, arrange for a 24-hour caregiver or nurse, so your loved one is never left alone. Or take turns sitting next to the patient s bedside with other family members and friends.    Alert the health care provider if your loved one s delirium worsens. If needed, medicine can be prescribed to help your loved one sleep or stay calm.  Treatment of delirium as death nears  Treatment of delirium may change when your loved one is near death. The health care provider and health care team can help prepare you for what to expect in the last days of life. If delirium is severe, ask the health care provider about choices for keeping your loved one comfortable. Don t be afraid to ask questions or seek help at this time. If more support is needed, other health care team members, like a  or , can help.   Helpful tips for preventing delirium  Tips to help prevent delirium include:    Use visual and hearing aids as needed    Manage pain    Keep watch on the use of certain medicines or avoid them altogether. Certain medicines can trigger delirium.    Prevent medical complications or treat them if they happen. Some medical conditions are known to cause or worsen delirium.  Delirium and the caregiver  Caregivers of patients with delirium play an important role that has 3 parts. They try to prevent delirium and spot it if it happens. They also act as patent advocates. And, they help to keep watch on patient symptoms. But caring for patients with delirium can take its toll on caregivers. They need support to cope with the stress and emotions of providing such care. If you are a caregiver to a patient with  delirium, you may find a support group and/or educational materials helpful during this stressful and emotional time.       6794-9294 [employer]. [address] [place] [address], [place] [address]. All rights reserved. This information is not intended as a substitute for professional medical care. Always follow your healthcare professional's instructions.

## 2017-02-09 ENCOUNTER — COMMUNICATION - HEALTHEAST (OUTPATIENT)
Dept: NEUROLOGY | Facility: CLINIC | Age: 77
End: 2017-02-09

## 2017-04-19 ENCOUNTER — RECORDS - HEALTHEAST (OUTPATIENT)
Dept: LAB | Facility: CLINIC | Age: 77
End: 2017-04-19

## 2017-04-19 LAB
CHOLEST SERPL-MCNC: 120 MG/DL
FASTING STATUS PATIENT QL REPORTED: ABNORMAL
HDLC SERPL-MCNC: 34 MG/DL
LDLC SERPL CALC-MCNC: 67 MG/DL
TRIGL SERPL-MCNC: 96 MG/DL

## 2021-05-30 VITALS — BODY MASS INDEX: 24.87 KG/M2 | WEIGHT: 136 LBS

## 2021-06-08 NOTE — PROGRESS NOTES
Assessment / Impression   1.  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  Depression improved on present therapies  3.  Dyspnea on exertion  4.  Cognitive abulia  5.  Chronic renal insufficiency        Plan: #1 increase venlafaxine to 112.5 mg by mouth daily    #2 check chest x-ray EKG and screening blood tests    Long conversation with the patient and family as well as caregiver in attendance.  This patient is clearly demonstrating improvement with therapies and tended to treat depression.  I note much less in the way of expressions of depression at this time.  Family is concerned regarding what they proceed to be some dyspnea on exertion and we will evaluate as noted above in.  In the meantime I will increase venlafaxine 212.5 mg per day.  Patient will return in 2 weeks for follow-up and further dose titration.  Overall, the patient does appear to be responding to treatment strategies managing depression.  Total time of this evaluation 30 minutes with the majority spent in counseling and care coordination.      Subjective:     HPI: Genia Montana is a 76 y.o. female wit above-noted diagnoses returns for follow-up.  The patient does appear to be much less depressed.  I know better posture, better eye contact and no expressions of depletion.  I also noted a greater degree of spontaneity with respect to verbal output.  The patient continues to demonstrate very poor generation of thought content but no overt evidence of confusion.  Family note the patient is no longer posturing as if depleted.  Caregiver does note some dyspnea on exertion after the patient has walked the length of the hallway within the care facility where she lives.    The patient today again appears to be more alert and more interactive than that noted previously.  She makes good eye contact with this examiner.  Her responses to questions are socially appropriate but devoid of content.  She tends to deny symptoms of depression  at this time but does state that she is suffered from an upper respiratory infection here in the recent past.  No evidence of headache chest discomfort shortness of breath currently.  No abdominal pain or nausea.  The remainder for general medical review of systems is negative.            Review of Systems:      as above    Objective:     Vitals:    01/11/17 1336 01/11/17 1337 01/11/17 1338   BP: 180/76 176/75 (!) 182/74   Pulse: 62 69 66   Weight: 136 lb (61.7 kg)         No results found for this or any previous visit (from the past 24 hour(s)).    Physical ExThe patient appears to be in no acute physical distress.  She seated for evaluation.  She is reasonably neatly groomed and casually dressed.  No observable evidence of physical distress.  Or fracture reveals no retained or secretions.  Lungs are completely to auscultation with normal work of breathing.  No evidence of rales rhonchi or wheezes.  Cardiac exam reveals a distant regular S1 and S2 without gallop.  Abdomen is not distended and nontender to mild palpation.  Extremities are without significant edema.  External Rieser cool to palpation.  Cognitively, the patient is alert but with a paucity of thought content.  She engages in no spontaneous conversation.  Affect is appear to be softer and again I note no posturing or gesturing suggesting significant depression at this time.  No fluctuation in level of consciousness as a bertha distractibility.  The patient demonstrates no evidence of abnormal thought content or form.

## 2021-06-08 NOTE — PROGRESS NOTES
Persons accompanying you (the patient) today: Caretaker: Genesis Byrd     How have you been doing since we saw you last? Please note any concerns.  F/u appt, no current med list but went over meds with caretaker, she states pt is no longer taking Ritalin.     Please list any surgeries or procedures you have had since we saw you last:  None     Have you had any falls since your last visit? No    Do you have any pain today? No    With whom do you currently reside? (alone, spouse, family, assisted living, nursing home)  [facility]   If assisted living or nursing home, please note name and location of facility: same